# Patient Record
Sex: MALE | Race: WHITE | NOT HISPANIC OR LATINO | Employment: OTHER | URBAN - METROPOLITAN AREA
[De-identification: names, ages, dates, MRNs, and addresses within clinical notes are randomized per-mention and may not be internally consistent; named-entity substitution may affect disease eponyms.]

---

## 2018-05-19 NOTE — PATIENT DISCUSSION
BMI above normal limits, recommend weight loss, improve diet and follow up with internist. 89 y/o male with PMHx of DM, HTN, Parkinson's disease, renal failure, macular degeneration presents to the ED BIBEMS from home c/o generalized weakness. Pt notes he has left sided facial swelling and erythema. States he had a nose biopsy yesterday at dermatology and woke up this morning to vesicular lesions on left side of chin and lip. +left oracle bleeding. 89 y/o male with PMHx of DM, HTN, Parkinson's disease, renal failure, macular degeneration presents to the ED BIBEMS from home c/o generalized weakness. Pt notes he has left sided facial swelling and erythema. States he had a nose biopsy yesterday at dermatology and woke up this morning to vesicular lesions on left side of chin and lip. +left auricle bleeding.

## 2019-12-13 NOTE — PROCEDURE NOTE: CLINICAL
PROCEDURE NOTE: Lucentis 0.5mg PFS OD. Diagnosis: Branch Retinal Vein Occlusion with Macular Edema. Anesthesia: Subconjunctival. Prep: Betadine Flush. Prior to injection, risks/benefits/alternatives discussed including but not limited to infection, loss of vision or eye, hemorrhage, cataract, glaucoma, retinal tears or detachment. The patient wished to proceed with treatment. Topical anesthesia was induced with Alcaine. Additional anesthesia was achieved using drop(s) or injection checked above. A drop of Povidone-iodine 5% ophthalmic solution was instilled over the injection site and in the inferior fornix. Betadine prep was performed. A single use prefilled syringe of intravitreal Lucentis 0.5mg/0.05ml was used and excess discarded. The needle was passed 3.0 mm posterior to the limbus in pseudophakic patients, and 3.5 mm posterior to the limbus in phakic patients. The remainder of the Lucentis 0.5mg in the single-use vial was then discarded in a medical waste disposal container. The eye was irrigated with sterile irrigating solution. Patient tolerated the procedure well. There were no complications. Post procedure instructions given. CF vision checked. Injection Time 302. The patient was instructed to return for re-evaluation in approximately 4-12 weeks depending on his/her condition and was told to call immediately if vision decreases and/or if his/her eye becomes red, painful, and/or light sensitive. The patient was instructed to go to the emergency room or call 911 if unable to reach the doctor within an hour or two of trying or calling. Christiano Hassan

## 2022-01-31 ENCOUNTER — EMERGENCY VISIT (OUTPATIENT)
Dept: URBAN - METROPOLITAN AREA CLINIC 38 | Facility: CLINIC | Age: 77
End: 2022-01-31

## 2022-01-31 DIAGNOSIS — H11.31: ICD-10-CM

## 2022-01-31 DIAGNOSIS — H04.123: ICD-10-CM

## 2022-01-31 DIAGNOSIS — S05.01XA: ICD-10-CM

## 2022-01-31 PROCEDURE — 92012 INTRM OPH EXAM EST PATIENT: CPT

## 2022-01-31 ASSESSMENT — TONOMETRY
OS_IOP_MMHG: 16
OD_IOP_MMHG: 14

## 2022-01-31 ASSESSMENT — VISUAL ACUITY
OD_SC: 20/25
OS_SC: 20/20-1

## 2022-02-07 ENCOUNTER — FOLLOW UP (OUTPATIENT)
Dept: URBAN - METROPOLITAN AREA CLINIC 38 | Facility: CLINIC | Age: 77
End: 2022-02-07

## 2022-02-07 DIAGNOSIS — H04.123: ICD-10-CM

## 2022-02-07 DIAGNOSIS — S05.01XD: ICD-10-CM

## 2022-02-07 PROCEDURE — 92012 INTRM OPH EXAM EST PATIENT: CPT

## 2022-02-07 ASSESSMENT — TONOMETRY
OS_IOP_MMHG: 16
OD_IOP_MMHG: 14

## 2022-02-07 ASSESSMENT — VISUAL ACUITY
OS_SC: 20/20-2
OD_SC: 20/25+2
